# Patient Record
Sex: FEMALE | Race: WHITE
[De-identification: names, ages, dates, MRNs, and addresses within clinical notes are randomized per-mention and may not be internally consistent; named-entity substitution may affect disease eponyms.]

---

## 2021-12-28 ENCOUNTER — HOSPITAL ENCOUNTER (EMERGENCY)
Dept: HOSPITAL 75 - ER | Age: 61
LOS: 1 days | Discharge: HOME | End: 2021-12-29
Payer: COMMERCIAL

## 2021-12-28 VITALS — SYSTOLIC BLOOD PRESSURE: 159 MMHG | DIASTOLIC BLOOD PRESSURE: 79 MMHG

## 2021-12-28 VITALS — BODY MASS INDEX: 27.82 KG/M2 | HEIGHT: 59.02 IN | WEIGHT: 138.01 LBS

## 2021-12-28 DIAGNOSIS — K52.9: Primary | ICD-10-CM

## 2021-12-28 DIAGNOSIS — Z87.891: ICD-10-CM

## 2021-12-28 DIAGNOSIS — K21.9: ICD-10-CM

## 2021-12-28 DIAGNOSIS — Z90.722: ICD-10-CM

## 2021-12-28 DIAGNOSIS — Z90.710: ICD-10-CM

## 2021-12-28 LAB
ALBUMIN SERPL-MCNC: 4.3 GM/DL (ref 3.2–4.5)
ALP SERPL-CCNC: 45 U/L (ref 40–136)
ALT SERPL-CCNC: 22 U/L (ref 0–55)
AMYLASE SERPL-CCNC: 48 U/L (ref 25–125)
APTT PPP: YELLOW S
BACTERIA #/AREA URNS HPF: NEGATIVE /HPF
BASOPHILS # BLD AUTO: 0.1 10^3/UL (ref 0–0.1)
BASOPHILS NFR BLD AUTO: 1 % (ref 0–10)
BILIRUB SERPL-MCNC: 0.6 MG/DL (ref 0.1–1)
BILIRUB UR QL STRIP: NEGATIVE
BUN/CREAT SERPL: 19
CALCIUM SERPL-MCNC: 9.2 MG/DL (ref 8.5–10.1)
CHLORIDE SERPL-SCNC: 107 MMOL/L (ref 98–107)
CO2 SERPL-SCNC: 20 MMOL/L (ref 21–32)
CREAT SERPL-MCNC: 0.94 MG/DL (ref 0.6–1.3)
EOSINOPHIL # BLD AUTO: 0.3 10^3/UL (ref 0–0.3)
EOSINOPHIL NFR BLD AUTO: 3 % (ref 0–10)
FIBRINOGEN PPP-MCNC: CLEAR MG/DL
GFR SERPLBLD BASED ON 1.73 SQ M-ARVRAT: 60 ML/MIN
GLUCOSE SERPL-MCNC: 122 MG/DL (ref 70–105)
GLUCOSE UR STRIP-MCNC: NEGATIVE MG/DL
HCT VFR BLD CALC: 40 % (ref 35–52)
HGB BLD-MCNC: 13.6 G/DL (ref 11.5–16)
KETONES UR QL STRIP: NEGATIVE
LEUKOCYTE ESTERASE UR QL STRIP: NEGATIVE
LIPASE SERPL-CCNC: 24 U/L (ref 8–78)
LYMPHOCYTES # BLD AUTO: 2 10^3/UL (ref 1–4)
LYMPHOCYTES NFR BLD AUTO: 20 % (ref 12–44)
MANUAL DIFFERENTIAL PERFORMED BLD QL: NO
MCH RBC QN AUTO: 30 PG (ref 25–34)
MCHC RBC AUTO-ENTMCNC: 34 G/DL (ref 32–36)
MCV RBC AUTO: 89 FL (ref 80–99)
MONOCYTES # BLD AUTO: 0.6 10^3/UL (ref 0–1)
MONOCYTES NFR BLD AUTO: 6 % (ref 0–12)
NEUTROPHILS # BLD AUTO: 7.2 10^3/UL (ref 1.8–7.8)
NEUTROPHILS NFR BLD AUTO: 70 % (ref 42–75)
NITRITE UR QL STRIP: NEGATIVE
PH UR STRIP: 5.5 [PH] (ref 5–9)
PLATELET # BLD: 345 10^3/UL (ref 130–400)
PMV BLD AUTO: 10.4 FL (ref 9–12.2)
POTASSIUM SERPL-SCNC: 3.5 MMOL/L (ref 3.6–5)
PROT SERPL-MCNC: 7 GM/DL (ref 6.4–8.2)
PROT UR QL STRIP: NEGATIVE
RBC #/AREA URNS HPF: (no result) /HPF
SODIUM SERPL-SCNC: 138 MMOL/L (ref 135–145)
SP GR UR STRIP: >=1.03 (ref 1.02–1.02)
SQUAMOUS #/AREA URNS HPF: (no result) /HPF
WBC # BLD AUTO: 10.3 10^3/UL (ref 4.3–11)
WBC #/AREA URNS HPF: (no result) /HPF

## 2021-12-28 PROCEDURE — 83690 ASSAY OF LIPASE: CPT

## 2021-12-28 PROCEDURE — 80053 COMPREHEN METABOLIC PANEL: CPT

## 2021-12-28 PROCEDURE — 74176 CT ABD & PELVIS W/O CONTRAST: CPT

## 2021-12-28 PROCEDURE — 36415 COLL VENOUS BLD VENIPUNCTURE: CPT

## 2021-12-28 PROCEDURE — 74022 RADEX COMPL AQT ABD SERIES: CPT

## 2021-12-28 PROCEDURE — 82150 ASSAY OF AMYLASE: CPT

## 2021-12-28 PROCEDURE — 85025 COMPLETE CBC W/AUTO DIFF WBC: CPT

## 2021-12-28 PROCEDURE — 81000 URINALYSIS NONAUTO W/SCOPE: CPT

## 2021-12-28 NOTE — ED ABDOMINAL PAIN
General


Chief Complaint:  Abdominal/GI Problems


Stated Complaint:  PELVIC PAIN


Nursing Triage Note:  


Pt arrives via POV from home for c/o lower ABD/pelvic pain; onset week ago. Pt 


reports no BM in one week; also reports nausea without vomiting. Pt denies 


urinary changes.


Source of Information:  Patient





History of Present Illness


Date Seen by Provider:  Dec 28, 2021


Time Seen by Provider:  23:05


Initial Comments


PT ARRIVES VIA POV FROM HOME


C/O LOW ABDOMINAL PAIN/PELVIC PAIN X 1 WEEK, WORSE TODAY


C/O NAUSEA, NO VOMITING


C/O DECREASED APPETITE--HAD BISCUITS AND GRAVY THIS AM FOR BREAKFAST, NOTHING 

ELSE TO EAT TODAY. HAS BEEN DRINKING FLUIDS AND ARRIVES DRINKING BOTTLE OF WATER


HAS NOT HAD A BM IN OVER A WEEK--JUST PASSING SMALL AMOUNTS OF MUCOUS


HAS URGE TO HAVE A BM, BUT ONLY PASSES MUCOUS


C/O MUCH CRAMPING ON URINATION AND GOING SMALL AMOUNTS, BUT NO BURNING ON 

URINATION


PAIN IMPROVES ON STANDING, NOTHING WORSENS PAIN 


NO RADIATION OF PAIN 


HAS NOT TAKEN ANYTHING FOR PAIN AT ANY TIME. HAS APPLIED SALON PAS PATCHES TO 

AREA, WITHOUT RELIEF





PT SAW NP AT [facility] YESTERDAY FOR THIS PROBLEM--NO TESTS OF ANY KIND WERE

DONE AND NO RX'S GIVEN. REFERRED TO DR. CLARK, SURGEON. PT HAS AN APPOINTMENT

WITH HER TOMORROW MORNING FOR THIS PROBLEM





PT HAS HAD HYSTERECTOMY/BSO





PT STATES SHE HAS HAD SAME SYMPTOMS IN THE PAST AND HAD URETHRAL DILATION BY DR. TAWIL SEVERAL YEARS AGO. NO PROBLEMS SINCE THEN





PCP: [facility]





Allergies and Home Medications


Allergies


Coded Allergies:  


     No Known Drug Allergies (Verified  Allergy, Unknown, 10/10/07)





Patient Home Medication List


Home Medication List Reviewed:  Yes


Ciprofloxacin HCl (Ciprofloxacin HCl) 500 Mg Tablet, 500 MG PO BID


   Prescribed by: MARY WOODS on 12/29/21 0146


Dicyclomine HCl (Dicyclomine HCl) 20 Mg Tablet, 20 MG PO Q6H


   Prescribed by: MARY WOODS on 12/29/21 0146


Fexofenadine HCl (Allegra Allergy) 180 Mg Tablet, 180 MG PO DAILY, (Reported)


   Entered as Reported by: ANNA BEARD on 9/26/15 2049


L. Acidophilus/Pectin, Citrus (Acidophilus Capsule) 1 Each Capsule, 2 EACH PO 

QID


   Prescribed by: MARY WOODS on 12/29/21 0146


Metronidazole (Metronidazole) 500 Mg Tablet, 500 MG PO QID


   Prescribed by: MARY WOODS on 12/29/21 0146


Tramadol HCl (Ultram) 50 Mg Tablet, 50 MG PO Q4H


   Prescribed by: MARY WOODS on 12/29/21 0146





Review of Systems


Review of Systems


Constitutional:  no symptoms reported


EENTM:  No Symptoms Reported


Respiratory:  No Symptoms Reported


Cardiovascular:  No Symptoms Reported


Gastrointestinal:  See HPI, Abdominal Pain, Constipated, Nausea; Denies Rectal 

Bleeding, Denies Vomiting


Genitourinary:  See HPI


Musculoskeletal:  no symptoms reported


Skin:  no symptoms reported


Psychiatric/Neurological:  No Symptoms Reported


Endocrine:  No Symptoms Reported


Hematologic/Lymphatic:  No Symptoms Reported





Past Medical-Social-Family Hx


Patient Social History


Tobacco Use?:  Yes


Tobacco type used:  Cigarettes


Smoking Status:  Former Smoker


Use of E-Cig and/or Vaping dev:  No


Substance use?:  No


Alcohol Use?:  Yes


Alcohol Frequency:  Rarely


Pt feels they are or have been:  No





Immunizations Up To Date


Influenza Vaccine Up-to-Date:  No; Not Current


COVID19 Vaccine :  Moderna





Seasonal Allergies


Seasonal Allergies:  Yes





Past Medical History


Surgeries:  Yes


Adenoidectomy, Bladder Surgery, Ear Surgery, Hysterectomy, Oophorectomy, 

Tonsillectomy


Respiratory:  No


Cardiac:  Yes


High Cholesterol


Neurological:  No


Reproductive Disorders:  Yes


GYN History:  Hysterectomy, Menopausal


Genitourinary:  Yes (URETHRAL STRICTURE)


Gastrointestinal:  Yes (PANCREATITIS 2007)


Gastroesophageal Reflux, Pancreatitis


Musculoskeletal:  No


Endocrine:  No


HEENT:  Yes


Chronic Ear Infection, Tonsilitis


Cancer:  No





Family Medical History


No Pertinent Family Hx








PAST SURGICAL HISTORY:


-TONSILLECTOMY/ADENOIDECTOMY


-BILATERAL MYRINGOTOMY TUBES


-HYSTERECTOMY/BILATERAL SALPINGO-OOPHORECTOMY


-URETHRAL DILATION/CYSTOSCOPY 12/2007 BY DR. TAWIL





Physical Exam


Vital Signs





Vital Signs - First Documented








 12/28/21





 23:00


 


Temp 36.8


 


Pulse 68


 


Resp 18


 


B/P (MAP) 159/79 (105)


 


Pulse Ox 98


 


O2 Delivery Room Air





Capillary Refill : Less Than 3 Seconds


Height/Weight/BMI


Height: 4'11"


Weight: 127lbs. oz. [id number]; 27.00 BMI


Method:Stated


General Appearance:  WD/WN, no apparent distress


Respiratory:  normal breath sounds, no respiratory distress, no accessory muscle

use


Cardiovascular:  regular rate, rhythm, no murmur


Gastrointestinal:  normal bowel sounds, soft, no organomegaly, no pulsatile 

mass; No distended, No guarding, No rebound; tenderness (SUPRAPUBIC TENDERNESS);

No hernia, No mass


Extremities:  normal inspection


Back:  normal inspection, no CVA tenderness


Neurologic/Psychiatric:  CNs II-XII nml as tested, no motor/sensory deficits, 

alert, normal mood/affect, oriented x 3


Skin:  normal color (VERY TAN), warm/dry





Progress/Results/Core Measures


Results/Orders


Lab Results





Laboratory Tests








Test


 12/28/21


23:05 12/28/21


23:30 Range/Units


 


 


Urine Color YELLOW    


 


Urine Clarity CLEAR    


 


Urine pH 5.5   5-9  


 


Urine Specific Gravity >=1.030   1.016-1.022  


 


Urine Protein NEGATIVE   NEGATIVE  


 


Urine Glucose (UA) NEGATIVE   NEGATIVE  


 


Urine Ketones NEGATIVE   NEGATIVE  


 


Urine Nitrite NEGATIVE   NEGATIVE  


 


Urine Bilirubin NEGATIVE   NEGATIVE  


 


Urine Urobilinogen 0.2   < = 1.0  MG/DL


 


Urine Leukocyte Esterase NEGATIVE   NEGATIVE  


 


Urine RBC (Auto) 1+ H  NEGATIVE  


 


Urine RBC 0-2    /HPF


 


Urine WBC NONE    /HPF


 


Urine Squamous Epithelial


Cells 5-10 


 


  /HPF





 


Urine Crystals NONE    /LPF


 


Urine Bacteria NEGATIVE    /HPF


 


Urine Casts NONE    /LPF


 


Urine Mucus SMALL H   /LPF


 


Urine Culture Indicated NO    


 


White Blood Count


 


 10.3 


 4.3-11.0


10^3/uL


 


Red Blood Count


 


 4.48 


 3.80-5.11


10^6/uL


 


Hemoglobin  13.6  11.5-16.0  g/dL


 


Hematocrit  40  35-52  %


 


Mean Corpuscular Volume  89  80-99  fL


 


Mean Corpuscular Hemoglobin  30  25-34  pg


 


Mean Corpuscular Hemoglobin


Concent 


 34 


 32-36  g/dL





 


Red Cell Distribution Width  12.4  10.0-14.5  %


 


Platelet Count


 


 345 


 130-400


10^3/uL


 


Mean Platelet Volume  10.4  9.0-12.2  fL


 


Immature Granulocyte % (Auto)  0   %


 


Neutrophils (%) (Auto)  70  42-75  %


 


Lymphocytes (%) (Auto)  20  12-44  %


 


Monocytes (%) (Auto)  6  0-12  %


 


Eosinophils (%) (Auto)  3  0-10  %


 


Basophils (%) (Auto)  1  0-10  %


 


Neutrophils # (Auto)


 


 7.2 


 1.8-7.8


10^3/uL


 


Lymphocytes # (Auto)


 


 2.0 


 1.0-4.0


10^3/uL


 


Monocytes # (Auto)


 


 0.6 


 0.0-1.0


10^3/uL


 


Eosinophils # (Auto)


 


 0.3 


 0.0-0.3


10^3/uL


 


Basophils # (Auto)


 


 0.1 


 0.0-0.1


10^3/uL


 


Immature Granulocyte # (Auto)


 


 0.0 


 0.0-0.1


10^3/uL


 


Sodium Level  138  135-145  MMOL/L


 


Potassium Level  3.5 L 3.6-5.0  MMOL/L


 


Chloride Level  107    MMOL/L


 


Carbon Dioxide Level  20 L 21-32  MMOL/L


 


Anion Gap  11  5-14  MMOL/L


 


Blood Urea Nitrogen  18  7-18  MG/DL


 


Creatinine


 


 0.94 


 0.60-1.30


MG/DL


 


Estimat Glomerular Filtration


Rate 


 60 


  





 


BUN/Creatinine Ratio  19   


 


Glucose Level  122 H   MG/DL


 


Calcium Level  9.2  8.5-10.1  MG/DL


 


Corrected Calcium  9.0  8.5-10.1  MG/DL


 


Total Bilirubin  0.6  0.1-1.0  MG/DL


 


Aspartate Amino Transf


(AST/SGOT) 


 24 


 5-34  U/L





 


Alanine Aminotransferase


(ALT/SGPT) 


 22 


 0-55  U/L





 


Alkaline Phosphatase  45    U/L


 


Total Protein  7.0  6.4-8.2  GM/DL


 


Albumin  4.3  3.2-4.5  GM/DL


 


Amylase Level  48    U/L


 


Lipase  24  8-78  U/L








My Orders





Orders - MARY WOODS DO


Ua Culture If Indicated (12/28/21 22:55)


Ed Iv/Invasive Line Start (12/28/21 23:16)


Amylase (12/28/21 23:16)


Cbc With Automated Diff (12/28/21 23:16)


Comprehensive Metabolic Panel (12/28/21 23:16)


Lipase (12/28/21 23:16)


Ed Iv/Invasive Line Start (12/28/21 23:16)


Ondansetron Injection (Zofran Injectio (12/28/21 23:30)


Ed Iv/Invasive Line Start (12/28/21 23:16)


Lactated Ringers (Lr 1000 Ml Iv Solution (12/28/21 23:30)


Ct Abd/Pelvis Wo(Kidney Stone) (12/28/21 23:37)


Acute Abd Series (12/28/21 23:37)


Ciprofloxacin Tablet (Cipro Tablet) (12/29/21 01:45)


Metronidazole Tablet (Flagyl Tablet) (12/29/21 01:45)


Rx-Dicyclomine Capsule (Rx-Bentyl Capsul (12/29/21 01:42)


Rx-Tramadol Hcl (Rx-Ultram) (12/29/21 01:42)





Medications Given in ED





Current Medications








 Medications  Dose


 Ordered  Sig/Milly


 Route  Start Time


 Stop Time Status Last Admin


Dose Admin


 


 Lactated Ringer's  1,000 ml @ 


 0 mls/hr  Q0M ONCE


 IV  12/28/21 23:30


 12/28/21 23:31 DC 12/28/21 23:33


1,000 MLS/HR


 


 Metronidazole  500 mg  ONCE  ONCE


 PO  12/29/21 01:45


 12/29/21 01:46 DC 12/29/21 01:52


500 MG


 


 Ondansetron HCl  4 mg  ONCE  ONCE


 IVP  12/28/21 23:30


 12/28/21 23:31 DC 12/28/21 23:33


4 MG








Vital Signs/I&O











 12/28/21





 23:00


 


Temp 36.8


 


Pulse 68


 


Resp 18


 


B/P (MAP) 159/79 (105)


 


Pulse Ox 98


 


O2 Delivery Room Air














Blood Pressure Mean:                    105











Progress


Progress Note :  


Progress Note


GIVEN IV FLUIDS AND ZOFRAN


PAIN AND NAUSEA COMPLETELY RESOLVED AT DISMISSAL





MUCH DELAY IN OBTAINING CT REPORT





Diagnostic Imaging





Comments


ABDOMEN XRAYS--NO ACUTE PROCESS, PENDING RADIOLOGIST REVIEW





CT ABDOMEN/PELVIS--MURAL THICKENING OF SIGMOID COLON WITH ADJACENT FAT 

STRANDING, CONSISTENT WITH COLITIS. MODERATE SOLID STOOL IN COLON INCLUDING 

SIGMOID COLON, PROXIMAL TO REGION OF WORST MURAL THICKENING. -PER STAT RAD VIA 

FAX AT [id number]


   Reviewed:  Reviewed by Me





Departure


Impression





   Primary Impression:  


   Colitis


Disposition:  01 HOME, SELF-CARE


Condition:  Improved





Departure-Patient Inst.


Decision time for Depature:  01:40


Referrals:  


DIMITRIS NOEL MD (PCP/Family)


Primary Care Physician








BRUNA CLARK DO


Patient Instructions:  Colitis (DC)





Add. Discharge Instructions:  


CLEAR LIQUIDS--WATER, BROTH, JELLO, GATORADE





NO FOOD UNTIL YOU ARE RECHECKED AND CLEARED BY DRDavion 





KEEP YOUR APPOINTMENT WITH DR. EDUARDO TOMORROW





All discharge instructions reviewed with patient and/or family. Voiced 

understanding.


Scripts


Tramadol HCl (Ultram) 50 Mg Tablet


50 MG PO Q4H for Pain, #20 TAB


   Prov: MARY WOODS DO         12/29/21 


Dicyclomine HCl (Dicyclomine HCl) 20 Mg Tablet


20 MG PO Q6H for Abdominal Pain, #20 TAB


   Prov: MARY WOODS DO         12/29/21 


L. Acidophilus/Pectin, Citrus (Acidophilus Capsule) 1 Each Capsule


2 EACH PO QID, #40 CAP


   Prov: MARY WOODS DO         12/29/21 


Metronidazole (Metronidazole) 500 Mg Tablet


500 MG PO QID, #28 TAB 0 Refills


   Prov: MARY WOODS DO         12/29/21 


Ciprofloxacin HCl (Ciprofloxacin HCl) 500 Mg Tablet


500 MG PO BID, #14 TAB


   Prov: MARY WOODS DO         12/29/21











MARY WOODS DO                 Dec 28, 2021 23:23

## 2021-12-29 NOTE — DIAGNOSTIC IMAGING REPORT
Indication: Abdominal pain



PA chest, supine and upright abdominal images are obtained



Lung bases are clear. Bowel gas pattern is normal. There is large

amount of retained stool in the colon. There are no pathologic

masses or calcifications.



IMPRESSION: Fecal stasis



Dictated by: 



  Dictated on workstation # [id number]

## 2021-12-29 NOTE — DIAGNOSTIC IMAGING REPORT
PROCEDURE: CT urinary tract, rule out kidney stone.



TECHNIQUE: Multiple contiguous axial images were obtained through

the abdomen and pelvis without the use of intravenous contrast.

Auto Exposure Controls were utilized during the CT exam to meet

ALARA standards for radiation dose reduction. 



INDICATION:  Abdominal pain



The lung bases are clear. Liver appears normal. The gallbladder

appears normal. The pancreas appears normal. Spleen appears

normal. Kidneys and adrenals appear normal. Small bowel is not

dilated. There is moderate fecal stasis. Uterus is surgically

absent. Urinary bladder appears normal. There is calcific

atherosclerosis of aorta but no aneurysm. There is no

intraperitoneal free air or free fluid. The appendix is normal.



IMPRESSION: Fecal stasis. No acute abnormality seen.









Dictated by: 



  Dictated on workstation # [id number]

## 2023-01-30 ENCOUNTER — HOSPITAL ENCOUNTER (OUTPATIENT)
Dept: HOSPITAL 75 - CARD | Age: 63
End: 2023-01-30
Attending: INTERNAL MEDICINE
Payer: COMMERCIAL

## 2023-01-30 VITALS — DIASTOLIC BLOOD PRESSURE: 80 MMHG | SYSTOLIC BLOOD PRESSURE: 155 MMHG

## 2023-01-30 DIAGNOSIS — I25.10: ICD-10-CM

## 2023-01-30 DIAGNOSIS — I10: Primary | ICD-10-CM

## 2023-01-30 PROCEDURE — 93017 CV STRESS TEST TRACING ONLY: CPT

## 2023-01-30 PROCEDURE — 78452 HT MUSCLE IMAGE SPECT MULT: CPT

## 2023-01-30 NOTE — CARDIOLOGY STRESS TEST REPORT
Stress Test Report


Date of Procedure/Referring:


Date of Procedure:  Jan 30, 2023


PCP


Dwayne Noel MD


Admitting Physician


Admitting Physician:


 








Attending Physician:


Niurka Navarro MD





Indications:


HTN





Baseline Heart Rate:


61





Baseline Blood Pressure:


Blood Pressure Systolic:  155


Blood Pressure Diastolic:  80





Vital Signs








  Date Time  Temp Pulse Resp B/P (MAP) Pulse Ox O2 Delivery O2 Flow Rate FiO2


 


1/30/23 08:58  61  155/80 (105)    








Baseline Vital Signs





Vital Signs








  Date Time  Temp Pulse Resp B/P (MAP) Pulse Ox O2 Delivery O2 Flow Rate FiO2


 


1/30/23 08:58  61  155/80 (105)    











Baseline EKG:


Baseline EKG:  NSR





Summary:


After explaining the procedure and details to the patient, she  signed the c

onsent and was brought to the stress nuclear laboratory.





Patient exercised on standard Gopi protocol, EKG, heart rate and blood pressure

were monitored continuously, resting and stress doses of radio tracer were 

injected, imaging was acquired and reviewed in the short axis, horizontal long 

axis and vertical long axis views





Patient was able to exercise for a total of 6.30 minutes on Gopi protocol,  

METs 7.7


Maximum heart rate 143      


Maximum blood pressure 132/68       


Stress EKG, Minimal nondiagnostic changes


Recovery EKG, Return to baseline


TID:  0.9


SSS:  1


SDS:  1


EF:  80





Conclusion:


1.  Fair exercise tolerance for a total of 7 minutes and 30 seconds on standard 

Gopi protocol, 7.7 METS achieving 91% of maximal expected heart rate


2.  Appropriate heart rate and blood pressure response to exercise return to 

baseline during recovery


3.  Nondiagnostic EKG changes with exercise return to baseline during recovery


4.  No significant ischemia or infarction noted on SPECT images


5.  Normal left ventricular size, ejection fraction 80%





Copy


Copies To 1:   DWAYNE NOEL MD, BASHAR J MD              Jan 30, 2023 10:57